# Patient Record
(demographics unavailable — no encounter records)

---

## 2024-10-07 NOTE — ASSESSMENT
[FreeTextEntry1] : #Acne rosacea, chronic, flare; face - would favor more of a papulopustular rosacea picture with background erythema today; difficult to ascertain if any comedones on video - start doxycycline 100mg BID x 3 months, SED- take with food/water, do not lie down for 30 mins after taking, use photoprotection, Cannot use in pregnancy or breast feeding. - start tretinoin 0.025% cream at night- start off 2 nights a week and work up to nightly as tolerated, SED including irritation, cannot be used in pregnancy. Use gentle moisturizer. If not covered, buy otc differin adapalene gel - will reassess at [place] if helpful to add in  triple rosacea cream - PDL laser for background erythema  This visit was conducted via live synchronous audio/video telehealth with the patient and provider. The patient attested to being located in [facility] where the provider is also currently located and licensed to practice medicine. Verbal consent was obtained for this telemedicine encounter. Risks and benefits of using Telehealth services has been discussed with the patient. The patient has been given ample opportunity to discuss any questions regarding [facility] Telehealth services. All of the patient's questions were answered to his/her satisfaction.  RTC 2.5 months

## 2024-10-07 NOTE — PHYSICAL EXAM
[Alert] : alert [FreeTextEntry3] : General: well appearing person in nad, alert, pleasant Focused Skin Exam per patient preference: pink papules on b/l cheeks and chin, background erythema

## 2024-10-07 NOTE — HISTORY OF PRESENT ILLNESS
[FreeTextEntry1] : rpv- acne/rosacea [de-identified] : 19 yo F last seen 1 year ago, here today for f/u acne/rosacea. Was using metro cream, maybe some improvement. However, still has red bumps and background redness. Only on face, does flare with periods but also present all the time.   All: NKDA

## 2025-01-03 NOTE — ADDENDUM
[FreeTextEntry1] : Called Patient's Mom- [phone] (Za Judie) to discuss plan per request from Dr. So's MA.  Miriam Barbosa (timothy Gutierrez), [facility]

## 2025-01-03 NOTE — HISTORY OF PRESENT ILLNESS
[FreeTextEntry1] : f/u acne/rosacea [de-identified] : 18F last seen October 2024 by Dr. So, here for   1. F/u acne rosacea Started 3m of doxycycline 100 mg PO BID, tretinoin 0.025% at [facility]- notes improvement. Finishing doxy course tomorrow.  + flares with menses but still flaring all the time.  Never received [facility] rosacea triple cream discussed with Dr. So (see chart notes).  Does not wear SPF.  Previously used metro, maybe with improvement?  NKDA Hide Additional Notes?: No Detail Level: Simple Detail Level: Detailed

## 2025-01-03 NOTE — HISTORY OF PRESENT ILLNESS
[FreeTextEntry1] : f/u acne/rosacea [de-identified] : 18F last seen October 2024 by Dr. So, here for   1. F/u acne rosacea Started 3m of doxycycline 100 mg PO BID, tretinoin 0.025% at [facility]- notes improvement. Finishing doxy course tomorrow.  + flares with menses but still flaring all the time.  Never received [facility] rosacea triple cream discussed with Dr. oS (see chart notes).  Does not wear SPF.  Previously used metro, maybe with improvement?  NKDA

## 2025-01-03 NOTE — PHYSICAL EXAM
[FreeTextEntry3] : Focused skin exam performed  The relevant portions of the exam were performed today  AAOx3, NAD, well-appearing / pleasant Focused examination within normal limits with the exception of:  Many comedones (mainly open) throughout the face, w/ erythematous, pink papules on the b/l cheeks and chin and a background of erythema

## 2025-01-03 NOTE — ADDENDUM
[FreeTextEntry1] : Called Patient's Mom- [phone] (Za Judie) to discuss plan per request from Dr. So's MA.  Miriam Barbosa (timothy Gutierrez), [facility]

## 2025-01-03 NOTE — ASSESSMENT
[FreeTextEntry1] : 1. Acne rosacea, chronic, moderate flare - Improved s/p 3 mo course of doxycycline 100 mg PO BID but not at treatment goal. - Discussed nature, chronicity and unpredictable course. - Stop BPO use qHS- continue with BPO 4% qAM.  - Continue with tretinoin 0.025% cream qHS (do not recommend increasing in January, but perhaps next visit with Dr. So if indicated), and will start [facility] triple cream, ivermectin+metro+azelaic acid as per Dr. So. Proper use discussed.  [email or web address] [phone] - No pustules on exam today. - Reviewed trigger avoidance including but not limited to heat, stress, alcohol, caffeine, sunlight, spicy foods - Strict photoprotection with daily OTC broad-spectrum sunscreen and sun-protective behavior - Gentle skin care. wash with gentle pH-balanced cleanser qHS(Gentle, nonalkaline pH 5-7, fragrance-free, emollient cleanser once per day in the evening). Wait for the face to completely dry before applying topical therapy or other products, because stinging is more likely to occur when the skin is wet. Apply moisturizer before medication if stinging or sensitive skin. Avoid astringents, toners; alcohol-based products. - Light, water-based cosmetics (but powders are preferable to creams), silicone-based products are least irritating. Use micellar water with cotton ball to remove - Cosmetics, especially those with a green or yellow tint, may be effective in reducing the appearance of redness.  RTC 3 mos to f/u with Dr. So.

## 2025-01-03 NOTE — ASSESSMENT
[FreeTextEntry1] : 1. Acne rosacea, chronic, moderate flare - Improved s/p 3 mo course of doxycycline 100 mg PO BID but not at treatment goal. - Discussed nature, chronicity and unpredictable course. - Stop BPO use qHS- continue with BPO 4% qAM.  - Continue with tretinoin 0.025% cream qHS (do not recommend increasing in January, but perhaps next visit with Dr. So if indicated), and will start [facility] triple cream, ivermectin+metro+azelaic acid as per Dr. So. Proper use discussed.  [email or web address] [phone] - No pustules on exam today. - Reviewed trigger avoidance including but not limited to heat, stress, alcohol, caffeine, sunlight, spicy foods - Strict photoprotection with daily OTC broad-spectrum sunscreen and sun-protective behavior - Gentle skin care. wash with gentle pH-balanced cleanser qHS(Gentle, nonalkaline pH 5-7, fragrance-free, emollient cleanser once per day in the evening). Wait for the face to completely dry before applying topical therapy or other products, because stinging is more likely to occur when the skin is wet. Apply moisturizer before medication if stinging or sensitive skin. Avoid astringents, toners; alcohol-based products. - Light, water-based cosmetics (but powders are preferable to creams), silicone-based products are least irritating. Use micellar water with cotton ball to remove - Cosmetics, especially those with a green or yellow tint, may be effective in reducing the appearance of redness.  RTC 3 mos to f/u with Dr. So.

## 2025-01-09 NOTE — PHYSICAL EXAM
[Alert] : alert [Well Nourished] : well nourished [Healthy Appearance] : healthy appearance [No Acute Distress] : no acute distress [Normal Voice/Communication] : normal voice communication [Normal Rate and Effort] : normal respiratory rhythm and effort [Normal Mood] : mood was normal [Normal Affect] : affect was normal [Alert, Awake, Oriented as Age-Appropriate] : alert, awake, oriented as age appropriate

## 2025-01-09 NOTE — REASON FOR VISIT
[Routine Follow-Up] : a routine follow-up visit for [Allergy Evaluation/ Skin Testing] : allergy evaluation and or skin testing [Home] : at home, [unfilled] , at the time of the visit. [Other Location: e.g. Home (Enter Location, City,State)___] : at [unfilled] [Mother] : mother [Father] : father [FreeTextEntry2] : Za Dimas [FreeTextEntry3] : mother

## 2025-01-09 NOTE — HISTORY OF PRESENT ILLNESS
[Venom Reactions] : venom reactions [de-identified] : 18 year old female with allergic rhinitis, OAS, history of asthma and eczema, as well as acne rosacea, returns for a follow up.  1/9/2025: - 11/2024- developed itchy rash on the neck, back and shoulders. She went to ER, treated with prednisone which helped.  - since then she had 3 episodes of itchy red neck which resolves within an hour or so. One of the times occurred after the shower, one when she was vacuuming. She just put aquaphor on it and her symptoms resolved. She denies drinking alcohol associated with these episodes. Last episode was 2 weeks ago. Photos reviewed (after the shower episode)- facial and neck bright red flushing. - she is followed by dermatologist Acne rosacea - she still gets perennial nasal and ocular allergic symptoms that are worse in the spring and fall. She takes cetirizine PRN, off season she needs  - she had a walking pneumonia 2 weeks prior to onset of rash: treated with amoxicillin, prednisone and albuterol - eczema - resolved - has recorded diagnosis of asthma but denies any asthma symptoms aside of cough with URIs - Oral Allergy Syndrome (OAS):  ---carrot, cantaloupe,  banana, watermelon and now avoiding apples--throat gets itchy with them, gums feel itchy --- she has been toleraing bananas and watermelon; still avoids apples and cantaloupe ("hates cantaloupe anyway") - She is a freshman  in [employer]  2020: - She has been having rashes with patches on the hands and left axillae. She reports that her fingers were peeling.  She was seen by derm. Dr Uribe: dif. diagnosis included dyshidrotic eczema and contact dermatitis.  Rash improved now.   Allergic Rhinitis and asthma:  uses cetirizine PRN, hasn't needed it in a while. She hasn't been using nasal sprays in over a year. She reports intermittent nasal symptoms .  No asthma symptoms.    Oral allergy syndrome:  She is still avoiding carrot, cantaloupe banana, watermelon and now avoiding apples--throat gets itchy with them, gums feel itchy--never had hives or shortness of breath with any foods